# Patient Record
Sex: FEMALE | Race: WHITE | NOT HISPANIC OR LATINO
[De-identification: names, ages, dates, MRNs, and addresses within clinical notes are randomized per-mention and may not be internally consistent; named-entity substitution may affect disease eponyms.]

---

## 2017-10-10 ENCOUNTER — RESULT REVIEW (OUTPATIENT)
Age: 37
End: 2017-10-10

## 2018-03-12 ENCOUNTER — APPOINTMENT (OUTPATIENT)
Dept: DERMATOLOGY | Facility: CLINIC | Age: 38
End: 2018-03-12
Payer: COMMERCIAL

## 2018-03-12 VITALS — HEIGHT: 65 IN | WEIGHT: 128 LBS | BODY MASS INDEX: 21.33 KG/M2

## 2018-03-12 PROCEDURE — 99213 OFFICE O/P EST LOW 20 MIN: CPT

## 2018-03-12 RX ORDER — LEVONORGESTREL / ETHINYL ESTRADIOL AND ETHINYL ESTRADIOL 150-30(84)
0.15-0.03 &0.01 KIT ORAL
Refills: 0 | Status: ACTIVE | COMMUNITY

## 2018-08-01 ENCOUNTER — APPOINTMENT (OUTPATIENT)
Dept: ULTRASOUND IMAGING | Facility: CLINIC | Age: 38
End: 2018-08-01

## 2018-08-01 ENCOUNTER — APPOINTMENT (OUTPATIENT)
Dept: MAMMOGRAPHY | Facility: CLINIC | Age: 38
End: 2018-08-01

## 2018-12-04 ENCOUNTER — APPOINTMENT (OUTPATIENT)
Dept: DERMATOLOGY | Facility: CLINIC | Age: 38
End: 2018-12-04
Payer: COMMERCIAL

## 2018-12-04 DIAGNOSIS — D48.9 NEOPLASM OF UNCERTAIN BEHAVIOR, UNSPECIFIED: ICD-10-CM

## 2018-12-04 DIAGNOSIS — D22.5 MELANOCYTIC NEVI OF TRUNK: ICD-10-CM

## 2018-12-04 PROCEDURE — 99213 OFFICE O/P EST LOW 20 MIN: CPT | Mod: 25

## 2018-12-04 PROCEDURE — 11100 BX SKIN SUBCUTANEOUS&/MUCOUS MEMBRANE 1 LESION: CPT

## 2018-12-07 LAB — CORE LAB BIOPSY: NORMAL

## 2019-01-07 ENCOUNTER — APPOINTMENT (OUTPATIENT)
Dept: DERMATOLOGY | Facility: CLINIC | Age: 39
End: 2019-01-07

## 2019-03-13 ENCOUNTER — APPOINTMENT (OUTPATIENT)
Age: 39
End: 2019-03-13
Payer: COMMERCIAL

## 2019-03-13 DIAGNOSIS — L21.9 SEBORRHEIC DERMATITIS, UNSPECIFIED: ICD-10-CM

## 2019-03-13 PROCEDURE — 99213 OFFICE O/P EST LOW 20 MIN: CPT

## 2019-03-13 RX ORDER — LEVONORGESTREL AND ETHINYL ESTRADIOL 100-20(84)
0.1-0.02 & 0.01 KIT ORAL
Qty: 91 | Refills: 0 | Status: DISCONTINUED | COMMUNITY
Start: 2018-07-17 | End: 2019-03-13

## 2019-03-13 RX ORDER — OFLOXACIN 3 MG/ML
0.3 SOLUTION/ DROPS OPHTHALMIC
Qty: 5 | Refills: 0 | Status: DISCONTINUED | COMMUNITY
Start: 2018-07-17 | End: 2019-03-13

## 2019-03-13 RX ORDER — TRIAMCINOLONE ACETONIDE 1 MG/G
0.1 CREAM TOPICAL
Qty: 1 | Refills: 2 | Status: ACTIVE | COMMUNITY
Start: 2019-03-13

## 2019-03-13 NOTE — PHYSICAL EXAM
[FreeTextEntry3] : Skin examination performed of the face, neck, chest, hands, lower legs;\par The patient is well, alert and oriented, pleasant and cooperative.\par Eyelids, conjunctivae, oral mucosa, digits and nails all normal.  \par No cervical adenopathy.\par \par \par Scaling patches located on face- brow, NLF, cheeks; perinasal

## 2019-03-13 NOTE — HISTORY OF PRESENT ILLNESS
[de-identified] : Pt. c/o patchy red rashes;  brows, cheeks\par was given topical steroid (TAC 0.1%) by urgent care with good results\par

## 2019-03-13 NOTE — ASSESSMENT
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

## 2019-11-22 ENCOUNTER — APPOINTMENT (OUTPATIENT)
Dept: DERMATOLOGY | Facility: CLINIC | Age: 39
End: 2019-11-22
Payer: COMMERCIAL

## 2019-11-22 DIAGNOSIS — B36.0 PITYRIASIS VERSICOLOR: ICD-10-CM

## 2019-11-22 DIAGNOSIS — L30.9 DERMATITIS, UNSPECIFIED: ICD-10-CM

## 2019-11-22 DIAGNOSIS — D22.9 MELANOCYTIC NEVI, UNSPECIFIED: ICD-10-CM

## 2019-11-22 PROCEDURE — 99213 OFFICE O/P EST LOW 20 MIN: CPT

## 2019-11-22 RX ORDER — KETOCONAZOLE 20.5 MG/ML
2 SHAMPOO, SUSPENSION TOPICAL
Qty: 1 | Refills: 5 | Status: DISCONTINUED | COMMUNITY
Start: 2018-03-12 | End: 2019-11-22

## 2019-11-22 RX ORDER — KETOCONAZOLE 20.5 MG/ML
2 SHAMPOO, SUSPENSION TOPICAL
Qty: 1 | Refills: 11 | Status: ACTIVE | COMMUNITY
Start: 2019-11-22 | End: 1900-01-01

## 2019-11-22 RX ORDER — KETOCONAZOLE 20 MG/G
2 CREAM TOPICAL
Qty: 1 | Refills: 3 | Status: ACTIVE | COMMUNITY
Start: 2019-11-22 | End: 1900-01-01

## 2020-04-11 ENCOUNTER — EMERGENCY (EMERGENCY)
Facility: HOSPITAL | Age: 40
LOS: 0 days | Discharge: HOME | End: 2020-04-11
Payer: COMMERCIAL

## 2020-04-11 VITALS
HEART RATE: 95 BPM | TEMPERATURE: 98 F | DIASTOLIC BLOOD PRESSURE: 94 MMHG | RESPIRATION RATE: 18 BRPM | SYSTOLIC BLOOD PRESSURE: 138 MMHG | OXYGEN SATURATION: 100 %

## 2020-04-11 DIAGNOSIS — R52 PAIN, UNSPECIFIED: ICD-10-CM

## 2020-04-11 DIAGNOSIS — J02.9 ACUTE PHARYNGITIS, UNSPECIFIED: ICD-10-CM

## 2020-04-11 DIAGNOSIS — R50.9 FEVER, UNSPECIFIED: ICD-10-CM

## 2020-04-11 DIAGNOSIS — B34.9 VIRAL INFECTION, UNSPECIFIED: ICD-10-CM

## 2020-04-11 DIAGNOSIS — Z20.828 CONTACT WITH AND (SUSPECTED) EXPOSURE TO OTHER VIRAL COMMUNICABLE DISEASES: ICD-10-CM

## 2020-04-11 LAB — SARS-COV-2 RNA SPEC QL NAA+PROBE: SIGNIFICANT CHANGE UP

## 2020-04-11 PROCEDURE — 99283 EMERGENCY DEPT VISIT LOW MDM: CPT

## 2020-04-11 PROCEDURE — 87635 SARS-COV-2 COVID-19 AMP PRB: CPT

## 2020-04-11 NOTE — ED STATDOCS - NSFOLLOWUPINSTRUCTIONS_ED_ALL_ED_FT
How to get your Coronavirus (COVID-19) Testing Results:   Please be advised that you were tested for the coronavirus (COVID-19) in the Emergency Department at [facility].  You are to maintain self-quarantine procedures for 14 days until instructed otherwise by one of our healthcare agents. Please note that the test may take up to 2-4 days to result.  If you do not hear from us within 72 hours and you'd like to check on your results, you can call on of our coronavirus specialists at [address] [place] [address] (available 24/7).  Please DO NOT call the site where you received the test to obtain your results.

## 2020-04-11 NOTE — ED STATDOCS - PATIENT PORTAL LINK FT
You can access the FollowMyHealth Patient Portal offered by [facility] by registering at the following website: [email or web address]. By joining [employer]’s FollowMyHealth portal, you will also be able to view your health information using other applications (apps) compatible with our system.

## 2020-04-11 NOTE — ED ADULT TRIAGE NOTE - CHIEF COMPLAINT QUOTE
Patient provides verbal consent to receive results via text or email. Patient presents for COVID-19 testing; complains of HA, fatigue, and fever.

## 2020-04-11 NOTE — ED STATDOCS - OBJECTIVE STATEMENT
She presents with 4 days of fever, chills, body aches and dry throat.  Her coworker tested + for COVID19.  She presents for testing.

## 2020-09-01 ENCOUNTER — EMERGENCY (EMERGENCY)
Facility: HOSPITAL | Age: 40
LOS: 0 days | Discharge: HOME | End: 2020-09-01
Payer: COMMERCIAL

## 2020-09-01 VITALS
OXYGEN SATURATION: 100 % | DIASTOLIC BLOOD PRESSURE: 98 MMHG | RESPIRATION RATE: 18 BRPM | HEART RATE: 16 BPM | SYSTOLIC BLOOD PRESSURE: 154 MMHG | TEMPERATURE: 97 F

## 2020-09-01 DIAGNOSIS — Z11.59 ENCOUNTER FOR SCREENING FOR OTHER VIRAL DISEASES: ICD-10-CM

## 2020-09-01 PROCEDURE — 99283 EMERGENCY DEPT VISIT LOW MDM: CPT

## 2020-09-01 PROCEDURE — U0003: CPT

## 2020-09-01 NOTE — ED ADULT NURSE NOTE - CAS ELECT INFOMATION PROVIDED
DISCHARGE INSTRUCTIONS REVIEWED WITH PATIENT VERBALLY, PT VERBALIZED UNDERSTANDING OF DISCHARGE INSTRUCTIONS. PAPER COPY OF DISCHARGE INSTRUCTIONS GIVEN TO PATIENT WITH SELF SEBAS AND COVID 19 INFORMATION./DC instructions

## 2020-09-01 NOTE — ED STATDOCS - PHYSICAL EXAMINATION
Constitutional: NAD AAOx3  Eyes: EOMI, pupils equal  Head: Normocephalic, atraumatic  ENT: Throat is clear without erythema. No exudates. Airway is patent.  Mouth: no airway obstruction.   Cardiac: S1 S2. regular rate   Resp: Non-labored breathing, no tachypnea. Lungs CTA b/l. No w/r/r. Equal chest expansion and rise. O2sat 100% RA  GI: Abd soft. NT/ND.   Neuro: CN2-12 intact. No focal deficits.   Skin: No rashes  ~Paco Mireles PA-C

## 2020-09-01 NOTE — ED ADULT NURSE NOTE - OBJECTIVE STATEMENT
Patient requesting covid swab. Patient provides verbal consent to receive results via email or text. patient treated and discharged by provider.

## 2020-09-01 NOTE — ED STATDOCS - OBJECTIVE STATEMENT
Patient presents to [facility] for screening for COVID-19 for traveling purposes. Patient has no acute S&S of fever, chills, cough, SOB, HUYNH, n/v/d. Patient may have been exposed to COVID-19. ~Paco Mireles PA-C.

## 2020-09-01 NOTE — ED STATDOCS - PATIENT PORTAL LINK FT
You can access the FollowMyHealth Patient Portal offered by [facility] by registering at the following website: [email or web address]. By joining [employer]’s FollowMyHealth portal, you will also be able to view your health information using other applications (apps) compatible with our system.

## 2020-09-01 NOTE — ED STATDOCS - PROGRESS NOTE DETAILS
How to get your Coronavirus (COVID-19) Testing Results:   Please be advised that you were tested for the coronavirus (COVID-19) in the Emergency Department at [facility].  You are to maintain self-quarantine procedures for 14 days until instructed otherwise by one of our healthcare agents. Please note that the test may take up to 2-4 days to result.  If you do not hear from us within 72 hours and you'd like to check on your results, you can call on of our coronavirus specialists at [address] [place] [address] (available 24/7).  Please DO NOT call the site where you received the test to obtain your results. ~Paco Mireles PA-C

## 2020-09-01 NOTE — ED ADULT TRIAGE NOTE - CHIEF COMPLAINT QUOTE
Patient comes to ED for COVID test, asymptomatic  Patient provides verbal consent to receive results via text or email.

## 2020-09-01 NOTE — ED STATDOCS - NS ED ROS FT
ROS: Constitutional- DENIES fever, chills.  Respiratory- DENIES cough, SOB  Cardiac- no chest pain, no palpitations, ENT- DENIES rhinorrhea, no sore throat, no congestion. DENIES loss of sense of smell or taste. Abdomen- No nausea, no vomiting, no diarrhea.  Urinary- no dysuria, no urgency, no frequency.  Skin- No rashes ~Paco Mireles PA-C

## 2020-09-01 NOTE — ED STATDOCS - CLINICAL SUMMARY MEDICAL DECISION MAKING FREE TEXT BOX
Patient presents with concerns for COVID exposure.  As patient is nontoxic appearing, will test for COVID and d/c.  Quarantine reviewed and return precautions reviewed. ~Paco Mireles PA-C

## 2020-09-02 LAB — SARS-COV-2 RNA SPEC QL NAA+PROBE: SIGNIFICANT CHANGE UP

## 2021-01-04 ENCOUNTER — TRANSCRIPTION ENCOUNTER (OUTPATIENT)
Age: 41
End: 2021-01-04

## 2021-11-24 ENCOUNTER — TRANSCRIPTION ENCOUNTER (OUTPATIENT)
Age: 41
End: 2021-11-24

## 2022-01-23 ENCOUNTER — TRANSCRIPTION ENCOUNTER (OUTPATIENT)
Age: 42
End: 2022-01-23

## 2022-01-25 ENCOUNTER — TRANSCRIPTION ENCOUNTER (OUTPATIENT)
Age: 42
End: 2022-01-25

## 2022-01-27 ENCOUNTER — TRANSCRIPTION ENCOUNTER (OUTPATIENT)
Age: 42
End: 2022-01-27

## 2022-01-31 ENCOUNTER — TRANSCRIPTION ENCOUNTER (OUTPATIENT)
Age: 42
End: 2022-01-31

## 2022-03-23 NOTE — ED ADULT NURSE NOTE - FINAL NURSING ELECTRONIC SIGNATURE
11-Apr-2020 10:02 Purse String (Intermediate) Text: Given the location of the defect and the characteristics of the surrounding skin a purse string intermediate closure was deemed most appropriate.  Undermining was performed circumfirentially around the surgical defect.  A purse string suture was then placed and tightened.

## 2023-04-18 PROBLEM — Z00.00 ENCOUNTER FOR PREVENTIVE HEALTH EXAMINATION: Noted: 2023-04-18

## 2023-04-19 PROBLEM — Z78.9 OTHER SPECIFIED HEALTH STATUS: Chronic | Status: ACTIVE | Noted: 2020-09-01

## 2023-04-26 ENCOUNTER — APPOINTMENT (OUTPATIENT)
Dept: ORTHOPEDIC SURGERY | Facility: CLINIC | Age: 43
End: 2023-04-26
Payer: COMMERCIAL

## 2023-04-26 VITALS
WEIGHT: 130 LBS | SYSTOLIC BLOOD PRESSURE: 126 MMHG | BODY MASS INDEX: 21.66 KG/M2 | DIASTOLIC BLOOD PRESSURE: 89 MMHG | HEIGHT: 65 IN | HEART RATE: 73 BPM

## 2023-04-26 DIAGNOSIS — Z86.79 PERSONAL HISTORY OF OTHER DISEASES OF THE CIRCULATORY SYSTEM: ICD-10-CM

## 2023-04-26 DIAGNOSIS — Z78.9 OTHER SPECIFIED HEALTH STATUS: ICD-10-CM

## 2023-04-26 PROCEDURE — 73560 X-RAY EXAM OF KNEE 1 OR 2: CPT | Mod: RT

## 2023-04-26 PROCEDURE — 20610 DRAIN/INJ JOINT/BURSA W/O US: CPT | Mod: RT

## 2023-04-26 PROCEDURE — 99072 ADDL SUPL MATRL&STAF TM PHE: CPT

## 2023-04-27 PROBLEM — Z86.79 HISTORY OF HYPERTENSION: Status: RESOLVED | Noted: 2023-04-27 | Resolved: 2023-04-27

## 2023-04-27 PROBLEM — Z78.9 CONSUMES ALCOHOL: Status: ACTIVE | Noted: 2023-04-27

## 2023-04-27 PROBLEM — Z78.9 MODERATE EXERCISE: Status: ACTIVE | Noted: 2023-04-27

## 2023-05-01 NOTE — PHYSICAL EXAM
[de-identified] : Right Knee:\par Knee: Range of Motion in Degrees	\par 	                  Claimant:	Normal:	\par Flexion Active	  135 	                135-degrees	\par Flexion Passive	  135	                135-degrees	\par Extension Active	  0-5	                0-5-degrees	\par Extension Passive	  0-5	                0-5-degrees	\par \par No weakness to flexion/extension.  No evidence of instability in the AP plane or varus or valgus stress.  Negative  Lachman.  Negative pivot shift.  Negative anterior drawer test.  Negative posterior drawer test. Negative Jair.  Negative Apley grind.  No medial or lateral joint line tenderness.  No tenderness over the medial and lateral facet of the patella.  No patellofemoral crepitations.  No lateral tilting patella.  No patella apprehension.  No crepitation in the medial and lateral femoral condyle.  Positive tenderness over the lateral femoral condyle with flexion and extension.  No proximal or distal swelling, edema or tenderness.  No gross motor or sensory deficits.  No intra-articular swelling.  Positive extra-articular swelling and tenderness over the lateral femoral condyle.  2+ DP and PT pulses.  No varus or valgus malalignment.  Skin is intact.  No rashes, scars or lesions. \par  \par Left Knee:\par Knee: Range of Motion in Degrees	\par 	                  Claimant:	Normal:	\par Flexion Active	  135 	                135-degrees	\par Flexion Passive	  135	                135-degrees	\par Extension Active	  0-5	                0-5-degrees	\par Extension Passive	  0-5	                0-5-degrees	\par \par No weakness to flexion/extension.  No evidence of instability in the AP plane or varus or valgus stress.  Negative  Lachman.  Negative pivot shift.  Negative anterior drawer test.  Negative posterior drawer test.  Negative Jair.  Negative Apley grind.  No medial or lateral joint line tenderness.  No tenderness over the medial and lateral facet of the patella.  No patellofemoral crepitations.  No lateral tilting patella.  No patellar apprehension.  No crepitation in the medial and lateral femoral condyle.  No proximal or distal swelling, edema or tenderness.  No gross motor or sensory deficits.  No intra-articular swelling.  2+ DP and PT pulses. No varus or valgus malalignment.  Skin is intact.  No rashes, scars or lesions.  \par   [de-identified] : Gait and Station:  Ambulating with a slightly antalgic to antalgic gait.  Station:  Normal.  [de-identified] : Appearance:  Well-developed, well-nourished female in no acute distress.\par   [de-identified] : Radiographs, one to two views of the right knee taken in the office today, show no obvious osseous abnormalities.

## 2023-05-01 NOTE — DISCUSSION/SUMMARY
[de-identified] : The patient presents with iliotibial band syndrome of the right knee.  At this time, since she has failed other conservative modalities, the patient was given an injection to the lateral epicondyle of the right knee.  I recommend ice, elevation and reassessment in 3-4 weeks.

## 2023-05-01 NOTE — PROCEDURE
[de-identified] : Indication:  \par Iliotibial band syndrome right knee\par \par Consent: \par At this time, I have recommended an injection to the right knee.  The risks and benefits of the procedure were discussed with the patient in detail.  Upon verbal consent of the patient, we proceeded with the injection as noted below.  \par \par Description of Procedure:  \par After a sterile prep, the patient underwent an injection of approximately 9 mL of 1% Lidocaine (10 mg/mL) without epinephrine and 1 mL of Kenalog (40 mg/mL) into the right knee.  The patient tolerated the procedure well.  There were no complications.  \par \par :  [employer]par Drug Name:  Triamcinolone Acetonide Injectable Suspension USP\par NCD#:  [id number]\par Lot#:  [id number]\par Expiration Date:  01/2024

## 2023-05-01 NOTE — ADDENDUM
[FreeTextEntry1] : This note was written by Brandi Hernandez on 05/01/2023 acting as scribe for Darell Palm III, MD

## 2023-05-01 NOTE — HISTORY OF PRESENT ILLNESS
[de-identified] : The patient comes in today with complaints of pain to her right knee, going on for about 2.5 months.  She states she was skiing.  She denied any trauma.  She has done some therapy without any significant improvement.  The patient states the onset/injury occurred 02/01/2023.  The patient states the pain is constant.  The patient describes the pain as dull. [de-identified] : walking up and down stairs, stepping into shower [6] : the ailment interference is 6/10 [9] : the ailment interference is 9/10 [8] : the ailment interference is 8/10 [7] : the ailment interference is 7/10 [] : No [de-identified] : Advil [de-identified] : Voltaren

## 2023-05-11 ENCOUNTER — APPOINTMENT (OUTPATIENT)
Dept: ORTHOPEDIC SURGERY | Facility: CLINIC | Age: 43
End: 2023-05-11
Payer: COMMERCIAL

## 2023-05-11 VITALS
SYSTOLIC BLOOD PRESSURE: 141 MMHG | HEART RATE: 87 BPM | BODY MASS INDEX: 21.66 KG/M2 | DIASTOLIC BLOOD PRESSURE: 89 MMHG | HEIGHT: 65 IN | WEIGHT: 130 LBS

## 2023-05-11 DIAGNOSIS — M76.31 ILIOTIBIAL BAND SYNDROME, RIGHT LEG: ICD-10-CM

## 2023-05-11 PROCEDURE — 99072 ADDL SUPL MATRL&STAF TM PHE: CPT

## 2023-05-11 PROCEDURE — 99212 OFFICE O/P EST SF 10 MIN: CPT

## 2023-05-16 NOTE — ADDENDUM
[FreeTextEntry1] : This note was written by Tremaine Harris on 05/16/2023, acting as a scribe for Darell Palm III, MD

## 2023-05-16 NOTE — HISTORY OF PRESENT ILLNESS
[de-identified] : The patient comes in today for her right knee.  She states she feels great.\par

## 2023-05-16 NOTE — DISCUSSION/SUMMARY
[de-identified] : At this time, due to resolved right iliotibial band syndrome, she will return to full activities and follow up with me on an as needed basis.\par

## 2023-05-16 NOTE — PHYSICAL EXAM
[Normal] : Gait: normal [de-identified] : Right Knee: Range of Motion in Degrees\par 	\par 	                  Claimant:    Normal:	\par Flexion Active	    135 	    135-degrees	\par Flexion Passive	    135	    135-degrees	\par Extension Active	    0-5	    0-5-degrees	\par Extension Passive	    0-5	    0-5-degrees	\par \par No weakness to flexion/extension.  No evidence of instability in the AP plane or varus or valgus stress.  Negative  Lachman.  Negative pivot shift.  Negative anterior drawer test.  Negative posterior drawer test.  Negative Jair.  Negative Apley grind.  No medial or lateral joint line tenderness.  No tenderness over the medial and lateral facet of the patella.  No patellofemoral crepitations.  No lateral tilting patella.  No patellar apprehension.  No crepitation in the medial and lateral femoral condyle.  No proximal or distal swelling, edema or tenderness.  No gross motor or sensory deficits.  No intra-articular swelling.  2+ DP and PT pulses. No varus or valgus malalignment.  Skin is intact.  No rashes, scars or lesions.  \par   [de-identified] : Appearance:  Well developed, well-nourished female in no acute distress.\par

## 2025-07-01 ENCOUNTER — OFFICE (OUTPATIENT)
Dept: URBAN - METROPOLITAN AREA CLINIC 102 | Facility: CLINIC | Age: 45
Setting detail: OPHTHALMOLOGY
End: 2025-07-01
Payer: COMMERCIAL

## 2025-07-01 DIAGNOSIS — H52.4: ICD-10-CM

## 2025-07-01 DIAGNOSIS — H40.033: ICD-10-CM

## 2025-07-01 DIAGNOSIS — H11.041: ICD-10-CM

## 2025-07-01 DIAGNOSIS — H16.223: ICD-10-CM

## 2025-07-01 PROBLEM — H52.7 REFRACTIVE ERROR: Status: ACTIVE | Noted: 2025-07-01

## 2025-07-01 PROCEDURE — 92015 DETERMINE REFRACTIVE STATE: CPT | Performed by: OPHTHALMOLOGY

## 2025-07-01 PROCEDURE — 92004 COMPRE OPH EXAM NEW PT 1/>: CPT | Performed by: OPHTHALMOLOGY

## 2025-07-01 PROCEDURE — 92020 GONIOSCOPY: CPT | Performed by: OPHTHALMOLOGY

## 2025-07-01 ASSESSMENT — REFRACTION_MANIFEST
OD_ADD: +1.00
OD_AXIS: 090
OS_VA1: 20/20
OS_ADD: +1.00
OD_CYLINDER: -0.50
OD_VA1: 20/25
OS_SPHERE: PLANO
OD_SPHERE: +0.50

## 2025-07-01 ASSESSMENT — REFRACTION_AUTOREFRACTION
OD_AXIS: 092
OS_CYLINDER: -0.25
OD_CYLINDER: -0.50
OS_AXIS: 175
OS_SPHERE: +0.50
OD_SPHERE: +1.00

## 2025-07-01 ASSESSMENT — CONFRONTATIONAL VISUAL FIELD TEST (CVF)
OS_FINDINGS: FULL
OD_FINDINGS: FULL

## 2025-07-01 ASSESSMENT — REFRACTION_CURRENTRX
OS_OVR_VA: 20/
OD_SPHERE: +1.00
OD_VPRISM_DIRECTION: SV
OD_OVR_VA: 20/
OS_VPRISM_DIRECTION: SV
OS_SPHERE: +1.00

## 2025-07-01 ASSESSMENT — KERATOMETRY
OD_AXISANGLE_DEGREES: 090
OS_AXISANGLE_DEGREES: 090
OS_K2POWER_DIOPTERS: 46.00
OD_K1POWER_DIOPTERS: 45.75
OS_K1POWER_DIOPTERS: 45.00
OD_K2POWER_DIOPTERS: 45.75
METHOD_AUTO_MANUAL: AUTO

## 2025-07-01 ASSESSMENT — VISUAL ACUITY
OS_BCVA: 20/25
OD_BCVA: 20/25

## 2025-07-01 ASSESSMENT — TONOMETRY
OS_IOP_MMHG: 12
OD_IOP_MMHG: 16

## 2025-07-01 ASSESSMENT — CORNEAL PTERYGIUM: OD_PTERYGIUM: NASAL 1MM
